# Patient Record
Sex: MALE | Race: WHITE | Employment: UNEMPLOYED | URBAN - METROPOLITAN AREA
[De-identification: names, ages, dates, MRNs, and addresses within clinical notes are randomized per-mention and may not be internally consistent; named-entity substitution may affect disease eponyms.]

---

## 2019-03-15 ENCOUNTER — OFFICE VISIT (OUTPATIENT)
Dept: FAMILY MEDICINE CLINIC | Age: 29
End: 2019-03-15
Payer: COMMERCIAL

## 2019-03-15 VITALS
BODY MASS INDEX: 39 KG/M2 | TEMPERATURE: 98.1 F | HEART RATE: 107 BPM | OXYGEN SATURATION: 98 % | WEIGHT: 281.6 LBS | DIASTOLIC BLOOD PRESSURE: 78 MMHG | SYSTOLIC BLOOD PRESSURE: 130 MMHG

## 2019-03-15 DIAGNOSIS — F41.9 ANXIETY: ICD-10-CM

## 2019-03-15 DIAGNOSIS — R07.89 CHEST DISCOMFORT: ICD-10-CM

## 2019-03-15 DIAGNOSIS — R10.9 STOMACH DISCOMFORT: ICD-10-CM

## 2019-03-15 DIAGNOSIS — Z00.00 HEALTHCARE MAINTENANCE: ICD-10-CM

## 2019-03-15 DIAGNOSIS — K21.9 GASTROESOPHAGEAL REFLUX DISEASE, ESOPHAGITIS PRESENCE NOT SPECIFIED: Primary | ICD-10-CM

## 2019-03-15 PROCEDURE — 36415 COLL VENOUS BLD VENIPUNCTURE: CPT | Performed by: FAMILY MEDICINE

## 2019-03-15 PROCEDURE — 90715 TDAP VACCINE 7 YRS/> IM: CPT | Performed by: FAMILY MEDICINE

## 2019-03-15 PROCEDURE — 1036F TOBACCO NON-USER: CPT | Performed by: FAMILY MEDICINE

## 2019-03-15 PROCEDURE — 99204 OFFICE O/P NEW MOD 45 MIN: CPT | Performed by: FAMILY MEDICINE

## 2019-03-15 PROCEDURE — 90471 IMMUNIZATION ADMIN: CPT | Performed by: FAMILY MEDICINE

## 2019-03-15 PROCEDURE — G8484 FLU IMMUNIZE NO ADMIN: HCPCS | Performed by: FAMILY MEDICINE

## 2019-03-15 PROCEDURE — G8419 CALC BMI OUT NRM PARAM NOF/U: HCPCS | Performed by: FAMILY MEDICINE

## 2019-03-15 PROCEDURE — G8427 DOCREV CUR MEDS BY ELIG CLIN: HCPCS | Performed by: FAMILY MEDICINE

## 2019-03-15 RX ORDER — PANTOPRAZOLE SODIUM 20 MG/1
20 TABLET, DELAYED RELEASE ORAL
Qty: 90 TABLET | Refills: 1 | Status: SHIPPED | OUTPATIENT
Start: 2019-03-15

## 2019-03-15 ASSESSMENT — PATIENT HEALTH QUESTIONNAIRE - PHQ9
SUM OF ALL RESPONSES TO PHQ QUESTIONS 1-9: 2
2. FEELING DOWN, DEPRESSED OR HOPELESS: 1
1. LITTLE INTEREST OR PLEASURE IN DOING THINGS: 1
SUM OF ALL RESPONSES TO PHQ9 QUESTIONS 1 & 2: 2
SUM OF ALL RESPONSES TO PHQ QUESTIONS 1-9: 2

## 2019-03-15 ASSESSMENT — ENCOUNTER SYMPTOMS: BLOOD IN STOOL: 0

## 2019-03-16 LAB
ANION GAP SERPL CALCULATED.3IONS-SCNC: 13 MMOL/L (ref 3–16)
BUN BLDV-MCNC: 13 MG/DL (ref 7–20)
C-REACTIVE PROTEIN: 8.7 MG/L (ref 0–5.1)
CALCIUM SERPL-MCNC: 10 MG/DL (ref 8.3–10.6)
CHLORIDE BLD-SCNC: 102 MMOL/L (ref 99–110)
CHOLESTEROL, TOTAL: 165 MG/DL (ref 0–199)
CO2: 28 MMOL/L (ref 21–32)
CREAT SERPL-MCNC: 0.8 MG/DL (ref 0.9–1.3)
ESTIMATED AVERAGE GLUCOSE: 111.2 MG/DL
GFR AFRICAN AMERICAN: >60
GFR NON-AFRICAN AMERICAN: >60
GLUCOSE BLD-MCNC: 95 MG/DL (ref 70–99)
HBA1C MFR BLD: 5.5 %
HDLC SERPL-MCNC: 40 MG/DL (ref 40–60)
LDL CHOLESTEROL CALCULATED: 104 MG/DL
POTASSIUM SERPL-SCNC: 4.9 MMOL/L (ref 3.5–5.1)
SODIUM BLD-SCNC: 143 MMOL/L (ref 136–145)
TRIGL SERPL-MCNC: 107 MG/DL (ref 0–150)
VLDLC SERPL CALC-MCNC: 21 MG/DL

## 2019-03-18 ENCOUNTER — TELEPHONE (OUTPATIENT)
Dept: FAMILY MEDICINE CLINIC | Age: 29
End: 2019-03-18

## 2019-03-18 DIAGNOSIS — H60.502 ACUTE OTITIS EXTERNA OF LEFT EAR, UNSPECIFIED TYPE: Primary | ICD-10-CM

## 2019-03-18 LAB — TISSUE TRANSGLUTAMINASE IGA: 1 U/ML (ref 0–3)

## 2019-03-18 NOTE — TELEPHONE ENCOUNTER
Patient saw Dr. Mayelin Peng on Friday and was supposed to have ear drops sent in for him. Patient stated the pharmacy never got the prescription for the ear drops.

## 2019-04-22 ENCOUNTER — HOSPITAL ENCOUNTER (OUTPATIENT)
Dept: ULTRASOUND IMAGING | Age: 29
Discharge: HOME | End: 2019-04-22
Payer: COMMERCIAL

## 2019-04-22 DIAGNOSIS — R10.9 STOMACH DISCOMFORT: ICD-10-CM

## 2019-04-22 PROCEDURE — 76705 ECHO EXAM OF ABDOMEN: CPT

## 2019-06-12 ENCOUNTER — OFFICE VISIT (OUTPATIENT)
Dept: FAMILY MEDICINE CLINIC | Age: 29
End: 2019-06-12
Payer: COMMERCIAL

## 2019-06-12 VITALS
DIASTOLIC BLOOD PRESSURE: 70 MMHG | HEART RATE: 70 BPM | BODY MASS INDEX: 38.92 KG/M2 | SYSTOLIC BLOOD PRESSURE: 118 MMHG | WEIGHT: 281 LBS | TEMPERATURE: 98.4 F | OXYGEN SATURATION: 98 %

## 2019-06-12 DIAGNOSIS — G89.29 CHRONIC LEFT SHOULDER PAIN: Primary | ICD-10-CM

## 2019-06-12 DIAGNOSIS — R79.82 CRP ELEVATED: ICD-10-CM

## 2019-06-12 DIAGNOSIS — R07.9 CHEST PAIN, UNSPECIFIED TYPE: ICD-10-CM

## 2019-06-12 DIAGNOSIS — M25.512 CHRONIC LEFT SHOULDER PAIN: Primary | ICD-10-CM

## 2019-06-12 DIAGNOSIS — R06.2 WHEEZING: ICD-10-CM

## 2019-06-12 PROBLEM — E66.09 CLASS 2 OBESITY DUE TO EXCESS CALORIES WITHOUT SERIOUS COMORBIDITY WITH BODY MASS INDEX (BMI) OF 39.0 TO 39.9 IN ADULT: Status: ACTIVE | Noted: 2019-06-12

## 2019-06-12 PROBLEM — F41.9 ANXIETY: Status: ACTIVE | Noted: 2019-06-12

## 2019-06-12 PROCEDURE — 99214 OFFICE O/P EST MOD 30 MIN: CPT | Performed by: FAMILY MEDICINE

## 2019-06-12 PROCEDURE — 93000 ELECTROCARDIOGRAM COMPLETE: CPT | Performed by: FAMILY MEDICINE

## 2019-06-12 PROCEDURE — G8427 DOCREV CUR MEDS BY ELIG CLIN: HCPCS | Performed by: FAMILY MEDICINE

## 2019-06-12 PROCEDURE — G8419 CALC BMI OUT NRM PARAM NOF/U: HCPCS | Performed by: FAMILY MEDICINE

## 2019-06-12 PROCEDURE — 1036F TOBACCO NON-USER: CPT | Performed by: FAMILY MEDICINE

## 2019-06-12 ASSESSMENT — ENCOUNTER SYMPTOMS: WHEEZING: 1

## 2019-06-12 NOTE — PROGRESS NOTES
2019     Rhonda Campuzano (:  1990)is a 26 y.o. male, here for evaluation of the following medical concerns:    CC: shoulder pain    HPI     Shoulder pain  dog jerked him across yard a few years ago, on and off since then     Abdominal pain  Resolved on PPI    Chest pain  Going on for 2 years, thinks it could be MSK from his L shoulder, also possibly related posture    Wheezing  Only in mornings, doesn't smoke, has never had asthma, not having today    Elevated CRP  minorly elevated CRP of 8.7 on 3/15/19    Review of Systems   Respiratory: Positive for wheezing. Happening in the morning, not having on today's visit   Cardiovascular: Positive for chest pain. Off and on for several years   Musculoskeletal:        Chronic L shoulder pain         Prior to Visit Medications    Medication Sig Taking? Authorizing Provider   pantoprazole (PROTONIX) 20 MG tablet Take 1 tablet by mouth every morning (before breakfast) Yes Velma Mcneil MD   amoxicillin-clavulanate (AUGMENTIN) 875-125 MG per tablet Take 1 tablet by mouth 2 times daily  Historical Provider, MD   Loratadine (CLARITIN) 10 MG CAPS Take by mouth  Historical Provider, MD   traMADol (ULTRAM) 50 MG tablet Take 1 tablet by mouth every 6 hours as needed  LUCIE Roman CNP   HYDROcodone-acetaminophen (Kathleen Link) 5-325 MG per tablet Take 1 tablet by mouth every 6 hours as needed  LUCIE Roman CNP        Social History     Tobacco Use    Smoking status: Former Smoker     Packs/day: 1.00     Types: Cigarettes     Last attempt to quit: 10/15/2013     Years since quittin.6    Smokeless tobacco: Never Used   Substance Use Topics    Alcohol use: No        Vitals:    19 1144   BP: 118/70   Pulse: 70   Temp: 98.4 °F (36.9 °C)   TempSrc: Oral   SpO2: 98%   Weight: 281 lb (127.5 kg)     Estimated body mass index is 38.92 kg/m² as calculated from the following:    Height as of 10/19/15: 5' 11.25\" (1.81 m).     Weight as

## 2019-06-24 ENCOUNTER — OFFICE VISIT (OUTPATIENT)
Dept: FAMILY MEDICINE CLINIC | Age: 29
End: 2019-06-24
Payer: COMMERCIAL

## 2019-06-24 VITALS
HEIGHT: 71 IN | BODY MASS INDEX: 38.5 KG/M2 | DIASTOLIC BLOOD PRESSURE: 64 MMHG | TEMPERATURE: 98.6 F | SYSTOLIC BLOOD PRESSURE: 106 MMHG | HEART RATE: 73 BPM | WEIGHT: 275 LBS | OXYGEN SATURATION: 97 %

## 2019-06-24 DIAGNOSIS — E66.09 CLASS 2 OBESITY DUE TO EXCESS CALORIES WITHOUT SERIOUS COMORBIDITY WITH BODY MASS INDEX (BMI) OF 39.0 TO 39.9 IN ADULT: Primary | ICD-10-CM

## 2019-06-24 PROCEDURE — 1036F TOBACCO NON-USER: CPT | Performed by: NURSE PRACTITIONER

## 2019-06-24 PROCEDURE — 99214 OFFICE O/P EST MOD 30 MIN: CPT | Performed by: NURSE PRACTITIONER

## 2019-06-24 PROCEDURE — G8427 DOCREV CUR MEDS BY ELIG CLIN: HCPCS | Performed by: NURSE PRACTITIONER

## 2019-06-24 PROCEDURE — G8417 CALC BMI ABV UP PARAM F/U: HCPCS | Performed by: NURSE PRACTITIONER

## 2019-06-24 ASSESSMENT — ENCOUNTER SYMPTOMS
VOMITING: 0
CONSTIPATION: 0
NAUSEA: 0
BLOOD IN STOOL: 0
DIARRHEA: 0

## 2019-06-24 NOTE — PROGRESS NOTES
OUTPATIENT PROGRESS NOTE  Date of Service:  2019  Address: [place] [address]  [address] [address]  Dept: [phone]  Loc: [phone]    Subjective:      Patient ID:  [id number]  Darian Raines is a 26 y.o. male    HPI: pt does play video games for a living, pt does smoke mariajuana regularly     New Diet Counselin. What do you do for a living?    - stream video games. Sitting a lot   2. Do you eat out a lot or cook?    - both eating out more then cooking   3. What kind of foods do you like?    - picky eater, he likes some vegetables, chic marcelle a, [employer]  4. What is your nutrition background?    - pt feels like he knows what he should be eating but does not know. 5. How much do you work out currently?    - not as much as he should, he does squats walks around. 6. What is a good goal weight from your perspective?    - 220, pt said he does not think he was 180 in    7. Do you have a family and kids? - pt lives at home with his mom and dad. 8. How much water do you drink?   - pt only drinks water, 1.5 l of water a day. 9. Do you drink soda? If so how much?   - rarely, no alcohol   10. What challenges have you had in the past for losing weight? What worked what did not work?    - being more active has helped, not sitting around all day. 11. Do you understand how to read a food label?    - yes. 12. How many fruits and vegetables do you eat currently?   - 0-1. 13. Do you eat breakfast?   -if he gets up, technically. Pt does not have a good sleep schedule. If pt wanted he could regularly pt gets 7-10 hours   14. Do you eat late at night? How long before bed do you eat?    - sometimes, pt will go get fast food. 15. Do you understand how to measure heart rate? 16. What are your short term and long term goals? - to lose weight, decrease a1c, help hypertension, become more active, run a race  17.  Do you drink caffeine? If so how much? And what is the caffeine, Coffee or tea? Cream and sugar?    - sometimes, powdered drink he is sponsored 200 mg of caffeine. 18. What do they cook with? What kind of oil, butter?    - mom usually cooks, sister has to watch salt levels, she had congested heart   19. What are barriers you have for working out? What prevents you from getting to work out regularly?    - no more motivational.   20. What do you do when youre stressed out? Do you eat if so what kind of foods do you eat?    - not a stress eater, pt smokes weed. Review of Systems   Gastrointestinal: Negative for blood in stool, constipation, diarrhea, nausea and vomiting. Psychiatric/Behavioral: Negative for sleep disturbance. The patient is not nervous/anxious. All other systems reviewed and are negative. Objective:   Physical Exam   Constitutional: He is oriented to person, place, and time. He appears well-developed and well-nourished. Neurological: He is alert and oriented to person, place, and time. Skin: Skin is warm and dry. Capillary refill takes less than 2 seconds. Psychiatric: He has a normal mood and affect. His behavior is normal. Judgment and thought content normal.   Vitals reviewed. Assessment/Plan   1. Class 2 obesity due to excess calories without serious comorbidity with body mass index (BMI) of 39.0 to 39.9 in adult  Talked to pt about decreases calories, 1800 kcal a day, will add in exercise next visit and give pt some recipes to help, pt mom does do most the cooking. Pt will follow up in 8 weeks,     More then 50 % of time spent counseling pt on nutrition for weight.                  LUCIE Blanchard - CNP

## 2023-02-01 ENCOUNTER — HOSPITAL ENCOUNTER (OUTPATIENT)
Dept: GENERAL RADIOLOGY | Age: 33
Discharge: HOME | End: 2023-02-01
Payer: COMMERCIAL

## 2023-02-01 ENCOUNTER — HOSPITAL ENCOUNTER (OUTPATIENT)
Age: 33
Discharge: HOME | End: 2023-02-01
Payer: COMMERCIAL

## 2023-02-01 DIAGNOSIS — M54.6 PAIN IN THORACIC SPINE: ICD-10-CM

## 2023-02-01 DIAGNOSIS — M54.2 NECK PAIN: ICD-10-CM

## 2023-02-01 DIAGNOSIS — M54.50 LOW BACK PAIN, UNSPECIFIED BACK PAIN LATERALITY, UNSPECIFIED CHRONICITY, UNSPECIFIED WHETHER SCIATICA PRESENT: ICD-10-CM

## 2023-02-01 PROCEDURE — 72040 X-RAY EXAM NECK SPINE 2-3 VW: CPT

## 2023-02-01 PROCEDURE — 72100 X-RAY EXAM L-S SPINE 2/3 VWS: CPT

## 2023-02-01 PROCEDURE — 72070 X-RAY EXAM THORAC SPINE 2VWS: CPT
